# Patient Record
Sex: MALE
[De-identification: names, ages, dates, MRNs, and addresses within clinical notes are randomized per-mention and may not be internally consistent; named-entity substitution may affect disease eponyms.]

---

## 2021-05-21 ENCOUNTER — NON-APPOINTMENT (OUTPATIENT)
Age: 56
End: 2021-05-21

## 2021-05-21 ENCOUNTER — APPOINTMENT (OUTPATIENT)
Dept: INTERNAL MEDICINE | Facility: CLINIC | Age: 56
End: 2021-05-21
Payer: MEDICAID

## 2021-05-21 VITALS
HEIGHT: 69 IN | OXYGEN SATURATION: 99 % | WEIGHT: 182 LBS | RESPIRATION RATE: 14 BRPM | DIASTOLIC BLOOD PRESSURE: 81 MMHG | BODY MASS INDEX: 26.96 KG/M2 | TEMPERATURE: 97.7 F | SYSTOLIC BLOOD PRESSURE: 146 MMHG | HEART RATE: 86 BPM

## 2021-05-21 DIAGNOSIS — Z72.0 TOBACCO USE: ICD-10-CM

## 2021-05-21 DIAGNOSIS — Z80.7 FAMILY HISTORY OF OTHER MALIGNANT NEOPLASMS OF LYMPHOID, HEMATOPOIETIC AND RELATED TISSUES: ICD-10-CM

## 2021-05-21 DIAGNOSIS — Z78.9 OTHER SPECIFIED HEALTH STATUS: ICD-10-CM

## 2021-05-21 DIAGNOSIS — Z86.79 PERSONAL HISTORY OF OTHER DISEASES OF THE CIRCULATORY SYSTEM: ICD-10-CM

## 2021-05-21 DIAGNOSIS — H53.9 UNSPECIFIED VISUAL DISTURBANCE: ICD-10-CM

## 2021-05-21 PROCEDURE — 99396 PREV VISIT EST AGE 40-64: CPT | Mod: 25

## 2021-05-21 PROCEDURE — 93000 ELECTROCARDIOGRAM COMPLETE: CPT | Mod: 59

## 2021-05-21 PROCEDURE — 36415 COLL VENOUS BLD VENIPUNCTURE: CPT

## 2021-05-23 PROBLEM — Z86.79 HISTORY OF HYPERTENSION: Status: RESOLVED | Noted: 2021-05-21 | Resolved: 2021-05-23

## 2021-05-23 PROBLEM — Z72.0 OCCASIONAL CIGARETTE SMOKER: Status: ACTIVE | Noted: 2021-05-21

## 2021-05-23 PROBLEM — Z78.9 SOCIAL ALCOHOL USE: Status: ACTIVE | Noted: 2021-05-21

## 2021-05-23 PROBLEM — Z80.7 FAMILY HISTORY OF LYMPHOMA: Status: ACTIVE | Noted: 2021-05-21

## 2021-05-23 NOTE — END OF VISIT
[FreeTextEntry3] : I, Una Guerrero, personally transcribed these services in the presence of Dr. Luzmaria Carter MD. I, Dr. Luzmaria Carter MD, personally performed the services described in this documentation as scribed by Una Guerrero in my presence and it is both accurate and complete.\par

## 2021-05-23 NOTE — HEALTH RISK ASSESSMENT
[Good] : ~his/her~  mood as  good [] : Yes [Yes] : Yes [2 - 4 times a month (2 pts)] : 2-4 times a month (2 points) [1 or 2 (0 pts)] : 1 or 2 (0 points) [Never (0 pts)] : Never (0 points) [No] : In the past 12 months have you used drugs other than those required for medical reasons? No [No falls in past year] : Patient reported no falls in the past year [0] : 2) Feeling down, depressed, or hopeless: Not at all (0) [de-identified] : 6 cigarettes a day [de-identified] : socially [Audit-CScore] : 2 [de-identified] : regular [de-identified] : lightly active [[id number]] : 0 [ColonoscopyDate] : never

## 2021-05-23 NOTE — HISTORY OF PRESENT ILLNESS
[FreeTextEntry1] : Physical examination\par  [de-identified] : ASHWINI EDWARDS is a 56 year old male with a PMHX of  HTN who presents today for physical examination. He is feeling okay however he relates change in vision.

## 2021-05-23 NOTE — PLAN
[FreeTextEntry1] : Blood tests and urine sent to the lab\par \par EKG\par \par Opthalmology evaluation\par

## 2021-05-31 LAB
25(OH)D3 SERPL-MCNC: 36.4 NG/ML
ALBUMIN SERPL ELPH-MCNC: 4.6 G/DL
ALP BLD-CCNC: 73 U/L
ALT SERPL-CCNC: 17 U/L
ANION GAP SERPL CALC-SCNC: 15 MMOL/L
AST SERPL-CCNC: 19 U/L
BASOPHILS # BLD AUTO: 0.06 K/UL
BASOPHILS NFR BLD AUTO: 0.7 %
BILIRUB SERPL-MCNC: 0.3 MG/DL
BUN SERPL-MCNC: 12 MG/DL
CALCIUM SERPL-MCNC: 9.8 MG/DL
CHLORIDE SERPL-SCNC: 103 MMOL/L
CHOLEST SERPL-MCNC: 257 MG/DL
CO2 SERPL-SCNC: 20 MMOL/L
CREAT SERPL-MCNC: 0.77 MG/DL
EOSINOPHIL # BLD AUTO: 0.13 K/UL
EOSINOPHIL NFR BLD AUTO: 1.4 %
GLUCOSE SERPL-MCNC: 89 MG/DL
HCT VFR BLD CALC: 42.5 %
HDLC SERPL-MCNC: 50 MG/DL
HGB BLD-MCNC: 13.9 G/DL
IMM GRANULOCYTES NFR BLD AUTO: 0.3 %
LDLC SERPL CALC-MCNC: 182 MG/DL
LYMPHOCYTES # BLD AUTO: 2.53 K/UL
LYMPHOCYTES NFR BLD AUTO: 28 %
MAN DIFF?: NORMAL
MCHC RBC-ENTMCNC: 29.7 PG
MCHC RBC-ENTMCNC: 32.7 GM/DL
MCV RBC AUTO: 90.8 FL
MONOCYTES # BLD AUTO: 0.67 K/UL
MONOCYTES NFR BLD AUTO: 7.4 %
NEUTROPHILS # BLD AUTO: 5.61 K/UL
NEUTROPHILS NFR BLD AUTO: 62.2 %
NONHDLC SERPL-MCNC: 207 MG/DL
PLATELET # BLD AUTO: 267 K/UL
POTASSIUM SERPL-SCNC: 4.4 MMOL/L
PROT SERPL-MCNC: 7.4 G/DL
PSA SERPL-MCNC: 3.29 NG/ML
RBC # BLD: 4.68 M/UL
RBC # FLD: 13.1 %
SODIUM SERPL-SCNC: 139 MMOL/L
T3 SERPL-MCNC: 114 NG/DL
T4 FREE SERPL-MCNC: 1.3 NG/DL
T4 SERPL-MCNC: 7.7 UG/DL
TRIGL SERPL-MCNC: 127 MG/DL
TSH SERPL-ACNC: 0.89 UIU/ML
WBC # FLD AUTO: 9.03 K/UL

## 2021-06-04 LAB
APPEARANCE: CLEAR
BACTERIA: NEGATIVE
BILIRUBIN URINE: NEGATIVE
BLOOD URINE: NEGATIVE
COLOR: NORMAL
GLUCOSE QUALITATIVE U: NEGATIVE
HYALINE CASTS: 1 /LPF
KETONES URINE: NEGATIVE
LEUKOCYTE ESTERASE URINE: NEGATIVE
MICROSCOPIC-UA: NORMAL
NITRITE URINE: NEGATIVE
PH URINE: 6
PROTEIN URINE: NEGATIVE
RED BLOOD CELLS URINE: 1 /HPF
SPECIFIC GRAVITY URINE: 1.01
SQUAMOUS EPITHELIAL CELLS: 0 /HPF
UROBILINOGEN URINE: NORMAL
WHITE BLOOD CELLS URINE: 1 /HPF

## 2021-10-14 ENCOUNTER — APPOINTMENT (OUTPATIENT)
Dept: INTERNAL MEDICINE | Facility: CLINIC | Age: 56
End: 2021-10-14
Payer: MEDICAID

## 2021-10-14 VITALS
DIASTOLIC BLOOD PRESSURE: 92 MMHG | HEART RATE: 73 BPM | TEMPERATURE: 98.2 F | BODY MASS INDEX: 26.66 KG/M2 | OXYGEN SATURATION: 98 % | SYSTOLIC BLOOD PRESSURE: 146 MMHG | RESPIRATION RATE: 14 BRPM | HEIGHT: 69 IN | WEIGHT: 180 LBS

## 2021-10-14 PROCEDURE — 99213 OFFICE O/P EST LOW 20 MIN: CPT

## 2021-10-16 NOTE — END OF VISIT
[FreeTextEntry3] : I, Una Guerrero, personally transcribed these services in the presence of Dr. Luzmaria Carter MD. I, Dr. Luzmaria Carter MD, personally performed the services described in this documentation as scribed by Una Guerrero in my presence and it is both accurate and complete.

## 2021-10-16 NOTE — HISTORY OF PRESENT ILLNESS
[FreeTextEntry8] : ASHWINI EDWARDS is a 56 year old male  with a PMHX of HTN who presents today complaining of pain of left knee x 1 week after injury. Pt states that coming down the stairs he heard a noise in his left knee and has been in pain ever since. he is taking ibuprofen

## 2021-10-16 NOTE — PLAN
[FreeTextEntry1] : X-ray left knee\par \par Continue Ibuprofen prn pain\par \par Orthopedic evaluation\par \par Ace bandage\par \par \par

## 2021-10-16 NOTE — PHYSICAL EXAM
[No Acute Distress] : no acute distress [Well Nourished] : well nourished [Well Developed] : well developed [Well-Appearing] : well-appearing [Normal Sclera/Conjunctiva] : normal sclera/conjunctiva [PERRL] : pupils equal round and reactive to light [EOMI] : extraocular movements intact [Normal Outer Ear/Nose] : the outer ears and nose were normal in appearance [Normal Oropharynx] : the oropharynx was normal [No JVD] : no jugular venous distention [No Lymphadenopathy] : no lymphadenopathy [Supple] : supple [Thyroid Normal, No Nodules] : the thyroid was normal and there were no nodules present [No Respiratory Distress] : no respiratory distress  [No Accessory Muscle Use] : no accessory muscle use [Clear to Auscultation] : lungs were clear to auscultation bilaterally [Normal Rate] : normal rate  [Regular Rhythm] : with a regular rhythm [Normal S1, S2] : normal S1 and S2 [No Murmur] : no murmur heard [No Carotid Bruits] : no carotid bruits [No Abdominal Bruit] : a ~M bruit was not heard ~T in the abdomen [No Varicosities] : no varicosities [Pedal Pulses Present] : the pedal pulses are present [No Edema] : there was no peripheral edema [No Palpable Aorta] : no palpable aorta [No Extremity Clubbing/Cyanosis] : no extremity clubbing/cyanosis [Soft] : abdomen soft [Non Tender] : non-tender [Non-distended] : non-distended [No Masses] : no abdominal mass palpated [No HSM] : no HSM [Normal Bowel Sounds] : normal bowel sounds [Normal Posterior Cervical Nodes] : no posterior cervical lymphadenopathy [Normal Anterior Cervical Nodes] : no anterior cervical lymphadenopathy [No CVA Tenderness] : no CVA  tenderness [No Spinal Tenderness] : no spinal tenderness [Grossly Normal Strength/Tone] : grossly normal strength/tone [No Rash] : no rash [Coordination Grossly Intact] : coordination grossly intact [No Focal Deficits] : no focal deficits [Normal Gait] : normal gait [Deep Tendon Reflexes (DTR)] : deep tendon reflexes were 2+ and symmetric [Normal Affect] : the affect was normal [Normal Insight/Judgement] : insight and judgment were intact [FreeTextEntry1] : deferred [de-identified] : mild swelling and tenderness left knee

## 2023-05-23 ENCOUNTER — NON-APPOINTMENT (OUTPATIENT)
Age: 58
End: 2023-05-23

## 2023-05-23 ENCOUNTER — APPOINTMENT (OUTPATIENT)
Dept: INTERNAL MEDICINE | Facility: CLINIC | Age: 58
End: 2023-05-23
Payer: MEDICAID

## 2023-05-23 VITALS
HEIGHT: 69 IN | SYSTOLIC BLOOD PRESSURE: 131 MMHG | HEART RATE: 74 BPM | OXYGEN SATURATION: 99 % | TEMPERATURE: 98 F | BODY MASS INDEX: 27.7 KG/M2 | DIASTOLIC BLOOD PRESSURE: 88 MMHG | RESPIRATION RATE: 14 BRPM | WEIGHT: 187 LBS

## 2023-05-23 DIAGNOSIS — H57.9 UNSPECIFIED DISORDER OF EYE AND ADNEXA: ICD-10-CM

## 2023-05-23 DIAGNOSIS — Z00.00 ENCOUNTER FOR GENERAL ADULT MEDICAL EXAMINATION W/OUT ABNORMAL FINDINGS: ICD-10-CM

## 2023-05-23 DIAGNOSIS — S89.90XA UNSPECIFIED INJURY OF UNSPECIFIED LOWER LEG, INITIAL ENCOUNTER: ICD-10-CM

## 2023-05-23 PROCEDURE — 36415 COLL VENOUS BLD VENIPUNCTURE: CPT

## 2023-05-23 PROCEDURE — 93000 ELECTROCARDIOGRAM COMPLETE: CPT

## 2023-05-23 PROCEDURE — 99396 PREV VISIT EST AGE 40-64: CPT | Mod: 25

## 2023-05-23 NOTE — PLAN
[FreeTextEntry1] : Blood tests and urine sent to the lab\par EKG\par Chest Xray done yesterday\par Opthalmology evaluation

## 2023-05-23 NOTE — HEALTH RISK ASSESSMENT
[Good] : ~his/her~  mood as  good [Yes] : Yes [2 - 4 times a month (2 pts)] : 2-4 times a month (2 points) [1 or 2 (0 pts)] : 1 or 2 (0 points) [Never (0 pts)] : Never (0 points) [No] : In the past 12 months have you used drugs other than those required for medical reasons? No [No falls in past year] : Patient reported no falls in the past year [0] : 2) Feeling down, depressed, or hopeless: Not at all (0) [Current] : Current [Employed] : employed [Feels Safe at Home] : Feels safe at home [Single] : single [de-identified] : socially [Audit-CScore] : 2 [de-identified] : lightly active [de-identified] : regular [[id number]] : 0
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
## 2023-05-23 NOTE — END OF VISIT
[FreeTextEntry3] :  I, Geovanna Coley, personally transcribed these services in the presence of Dr. Luzmaria Carter MD.\par I, Dr. Luzmaria Carter MD, personally performed the services described in this documentation as scribed by Geovanna Coley in my presence and it is both accurate and complete.

## 2023-06-02 LAB
25(OH)D3 SERPL-MCNC: 16.6 NG/ML
ALBUMIN SERPL ELPH-MCNC: 4.5 G/DL
ALP BLD-CCNC: 71 U/L
ALT SERPL-CCNC: 15 U/L
ANION GAP SERPL CALC-SCNC: 17 MMOL/L
APPEARANCE: CLEAR
AST SERPL-CCNC: 16 U/L
BACTERIA: NEGATIVE /HPF
BILIRUB SERPL-MCNC: 0.3 MG/DL
BILIRUBIN URINE: NEGATIVE
BLOOD URINE: NEGATIVE
BUN SERPL-MCNC: 13 MG/DL
CALCIUM SERPL-MCNC: 9.8 MG/DL
CAST: 0 /LPF
CHLORIDE SERPL-SCNC: 103 MMOL/L
CHOLEST SERPL-MCNC: 257 MG/DL
CO2 SERPL-SCNC: 21 MMOL/L
COLOR: YELLOW
CREAT SERPL-MCNC: 0.81 MG/DL
EGFR: 101 ML/MIN/1.73M2
EPITHELIAL CELLS: 1 /HPF
ESTIMATED AVERAGE GLUCOSE: 108 MG/DL
GLUCOSE QUALITATIVE U: NEGATIVE MG/DL
GLUCOSE SERPL-MCNC: 90 MG/DL
HBA1C MFR BLD HPLC: 5.4 %
HDLC SERPL-MCNC: 52 MG/DL
KETONES URINE: NEGATIVE MG/DL
LDLC SERPL CALC-MCNC: 187 MG/DL
LEUKOCYTE ESTERASE URINE: NEGATIVE
MICROSCOPIC-UA: NORMAL
NITRITE URINE: NEGATIVE
NONHDLC SERPL-MCNC: 205 MG/DL
PH URINE: 5.5
POTASSIUM SERPL-SCNC: 4.8 MMOL/L
PROT SERPL-MCNC: 7.2 G/DL
PROTEIN URINE: NEGATIVE MG/DL
PSA SERPL-MCNC: 3.76 NG/ML
RED BLOOD CELLS URINE: 1 /HPF
SODIUM SERPL-SCNC: 141 MMOL/L
SPECIFIC GRAVITY URINE: 1.02
T3 SERPL-MCNC: 118 NG/DL
T4 FREE SERPL-MCNC: 1.2 NG/DL
T4 SERPL-MCNC: 7.7 UG/DL
TRIGL SERPL-MCNC: 88 MG/DL
TSH SERPL-ACNC: 0.78 UIU/ML
UROBILINOGEN URINE: 0.2 MG/DL
WHITE BLOOD CELLS URINE: 1 /HPF

## 2023-10-03 ENCOUNTER — APPOINTMENT (OUTPATIENT)
Dept: FAMILY MEDICINE | Facility: CLINIC | Age: 58
End: 2023-10-03
Payer: MEDICAID

## 2023-10-03 VITALS
SYSTOLIC BLOOD PRESSURE: 135 MMHG | HEART RATE: 79 BPM | OXYGEN SATURATION: 97 % | HEIGHT: 69 IN | RESPIRATION RATE: 16 BRPM | TEMPERATURE: 98 F | BODY MASS INDEX: 28.29 KG/M2 | WEIGHT: 191 LBS | DIASTOLIC BLOOD PRESSURE: 83 MMHG

## 2023-10-03 DIAGNOSIS — D22.9 MELANOCYTIC NEVI, UNSPECIFIED: ICD-10-CM

## 2023-10-03 DIAGNOSIS — E55.9 VITAMIN D DEFICIENCY, UNSPECIFIED: ICD-10-CM

## 2023-10-03 DIAGNOSIS — I10 ESSENTIAL (PRIMARY) HYPERTENSION: ICD-10-CM

## 2023-10-03 DIAGNOSIS — E78.5 HYPERLIPIDEMIA, UNSPECIFIED: ICD-10-CM

## 2023-10-03 PROCEDURE — 99214 OFFICE O/P EST MOD 30 MIN: CPT

## 2023-10-03 RX ORDER — CHOLECALCIFEROL (VITAMIN D3) 50 MCG
25 MCG TABLET ORAL DAILY
Qty: 180 | Refills: 0 | Status: ACTIVE | COMMUNITY
Start: 2023-10-03 | End: 1900-01-01

## 2023-10-03 RX ORDER — SIMVASTATIN 20 MG/1
20 TABLET, FILM COATED ORAL
Qty: 30 | Refills: 3 | Status: COMPLETED | COMMUNITY
Start: 2021-05-31 | End: 2023-10-03

## 2023-10-03 RX ORDER — AMLODIPINE BESYLATE 5 MG/1
5 TABLET ORAL DAILY
Qty: 30 | Refills: 0 | Status: ACTIVE | COMMUNITY

## 2023-10-03 RX ORDER — EZETIMIBE AND SIMVASTATIN 10; 20 MG/1; MG/1
10-20 TABLET ORAL DAILY
Qty: 30 | Refills: 3 | Status: ACTIVE | COMMUNITY
Start: 2023-06-02 | End: 1900-01-01